# Patient Record
Sex: MALE | HISPANIC OR LATINO | Employment: STUDENT | URBAN - METROPOLITAN AREA
[De-identification: names, ages, dates, MRNs, and addresses within clinical notes are randomized per-mention and may not be internally consistent; named-entity substitution may affect disease eponyms.]

---
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
## 2025-06-12 ENCOUNTER — LAB VISIT (OUTPATIENT)
Dept: LAB | Facility: HOSPITAL | Age: 20
End: 2025-06-12
Attending: UROLOGY
Payer: MEDICAID

## 2025-06-12 DIAGNOSIS — E29.1 3-OXO-5 ALPHA-STEROID DELTA 4-DEHYDROGENASE DEFICIENCY: Primary | ICD-10-CM

## 2025-06-12 LAB
ESTRADIOL SERPL HS-MCNC: 41 PG/ML (ref 11–44)
FSH SERPL-ACNC: 5.9 MIU/ML (ref 0.95–11.95)
LH SERPL-ACNC: 3.1 MIU/ML (ref 0.6–12.1)
PROLACTIN SERPL IA-MCNC: 20.7 NG/ML (ref 3.5–19.4)

## 2025-06-12 PROCEDURE — 36415 COLL VENOUS BLD VENIPUNCTURE: CPT

## 2025-06-12 PROCEDURE — 83001 ASSAY OF GONADOTROPIN (FSH): CPT

## 2025-06-12 PROCEDURE — 84146 ASSAY OF PROLACTIN: CPT

## 2025-06-12 PROCEDURE — 84270 ASSAY OF SEX HORMONE GLOBUL: CPT

## 2025-06-12 PROCEDURE — 83002 ASSAY OF GONADOTROPIN (LH): CPT

## 2025-06-12 PROCEDURE — 82670 ASSAY OF TOTAL ESTRADIOL: CPT

## 2025-06-16 LAB — W SEX HORMONE BINDING GLOBULIN: 23 NMOL/L

## 2025-06-18 ENCOUNTER — PATIENT MESSAGE (OUTPATIENT)
Dept: RESEARCH | Facility: HOSPITAL | Age: 20
End: 2025-06-18
Payer: MEDICAID